# Patient Record
Sex: FEMALE | Race: ASIAN | NOT HISPANIC OR LATINO | Employment: FULL TIME | URBAN - METROPOLITAN AREA
[De-identification: names, ages, dates, MRNs, and addresses within clinical notes are randomized per-mention and may not be internally consistent; named-entity substitution may affect disease eponyms.]

---

## 2022-08-18 ENCOUNTER — OFFICE VISIT (OUTPATIENT)
Dept: URGENT CARE | Facility: PHYSICIAN GROUP | Age: 27
End: 2022-08-18
Payer: OTHER GOVERNMENT

## 2022-08-18 ENCOUNTER — HOSPITAL ENCOUNTER (OUTPATIENT)
Dept: RADIOLOGY | Facility: MEDICAL CENTER | Age: 27
End: 2022-08-18
Attending: PHYSICIAN ASSISTANT
Payer: OTHER GOVERNMENT

## 2022-08-18 VITALS
DIASTOLIC BLOOD PRESSURE: 70 MMHG | RESPIRATION RATE: 18 BRPM | OXYGEN SATURATION: 100 % | HEART RATE: 80 BPM | WEIGHT: 142 LBS | HEIGHT: 65 IN | BODY MASS INDEX: 23.66 KG/M2 | SYSTOLIC BLOOD PRESSURE: 120 MMHG | TEMPERATURE: 97.7 F

## 2022-08-18 DIAGNOSIS — S86.912A KNEE STRAIN, LEFT, INITIAL ENCOUNTER: ICD-10-CM

## 2022-08-18 DIAGNOSIS — M25.562 ACUTE PAIN OF LEFT KNEE: ICD-10-CM

## 2022-08-18 PROCEDURE — 99203 OFFICE O/P NEW LOW 30 MIN: CPT | Performed by: PHYSICIAN ASSISTANT

## 2022-08-18 PROCEDURE — 73564 X-RAY EXAM KNEE 4 OR MORE: CPT | Mod: LT

## 2022-08-19 NOTE — PROGRESS NOTES
"  Subjective:   Rikki Chappell is a 27 y.o. female who presents today with   Chief Complaint   Patient presents with    Knee Pain     Pt landed her Left knee wrong and twisted it during taekwodo . Back of knee is swollen with aching pain, x 4 days ago.      Knee Pain  This is a new problem. Episode onset: 4 days. The problem occurs constantly. Progression since onset: 4 days. Treatments tried: Knee brace. The treatment provided no relief.   Patient states she was performing taekwondo and did a leg kick and landed on the left leg and felt a pop in the left knee.  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  She has been having some swelling and pain in the back of the knee since that occurred.    PMH:  has no past medical history on file.  MEDS: No current outpatient medications on file.  ALLERGIES: Not on File  SURGHX: History reviewed. No pertinent surgical history.  SOCHX:  reports that she has never smoked. She has never used smokeless tobacco. She reports current alcohol use. She reports that she does not use drugs.  FH: Reviewed with patient, not pertinent to this visit.     Review of Systems   Musculoskeletal:         Left knee pain      Objective:   /70   Pulse 80   Temp 36.5 °C (97.7 °F) (Temporal)   Resp 18   Ht 1.651 m (5' 5\")   Wt 64.4 kg (142 lb)   SpO2 100%   Breastfeeding No   BMI 23.63 kg/m²   Physical Exam  Vitals and nursing note reviewed.   Constitutional:       General: She is not in acute distress.     Appearance: Normal appearance. She is well-developed. She is not ill-appearing or toxic-appearing.   HENT:      Head: Normocephalic and atraumatic.      Right Ear: Hearing normal.      Left Ear: Hearing normal.   Eyes:      Pupils: Pupils are equal, round, and reactive to light.   Cardiovascular:      Rate and Rhythm: Normal rate.   Pulmonary:      Effort: Pulmonary effort is normal.   Musculoskeletal:      " Comments: TTP to the left knee especially posteriorly. NVI distally.  No laxity with valgus or varus motion.  Patella is aligned.  Negative anterior drawer test. Slightly antalgic gait favoring the left side.   Skin:     General: Skin is warm and dry.   Neurological:      Mental Status: She is alert.      Coordination: Coordination normal.   Psychiatric:         Mood and Affect: Mood normal.       DX KNEE  FINDINGS:  There is no fracture.     Alignment is normal.     No degenerative changes are present.     IMPRESSION:     Negative left knee series    Assessment/Plan:   Assessment    1. Acute pain of left knee  - DX-KNEE COMPLETE 4+ LEFT; Future    2. Knee strain, left, initial encounter  - Referral to Sports Medicine  Symptoms and presentation at this time are consistent with left knee strain.  Recommend follow-up with sports medicine for further evaluation at that time if symptoms persist.  Recommend continue with rest, ice, compression and elevation.    Differential diagnosis, natural history, supportive care, and indications for immediate follow-up discussed.   Patient given instructions and understanding of medications and treatment.    If not improving in 3-5 days, F/U with PCP or return to  if symptoms worsen.    Patient agreeable to plan.    Please note that this dictation was created using voice recognition software. I have made every reasonable attempt to correct obvious errors, but I expect that there are errors of grammar and possibly content that I did not discover before finalizing the note.    Faustino Jones PA-C

## 2022-08-30 ENCOUNTER — OFFICE VISIT (OUTPATIENT)
Dept: SPORTS MEDICINE | Facility: CLINIC | Age: 27
End: 2022-08-30
Payer: OTHER GOVERNMENT

## 2022-08-30 VITALS
HEART RATE: 64 BPM | OXYGEN SATURATION: 98 % | DIASTOLIC BLOOD PRESSURE: 66 MMHG | BODY MASS INDEX: 23.66 KG/M2 | RESPIRATION RATE: 14 BRPM | TEMPERATURE: 98.2 F | SYSTOLIC BLOOD PRESSURE: 108 MMHG | HEIGHT: 65 IN | WEIGHT: 142 LBS

## 2022-08-30 DIAGNOSIS — M25.462 KNEE EFFUSION, LEFT: ICD-10-CM

## 2022-08-30 DIAGNOSIS — R29.898 POSITIVE LACHMAN TEST: ICD-10-CM

## 2022-08-30 DIAGNOSIS — M25.562 ACUTE PAIN OF LEFT KNEE: ICD-10-CM
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
## 2022-08-30 PROCEDURE — 99213 OFFICE O/P EST LOW 20 MIN: CPT | Performed by: FAMILY MEDICINE

## 2022-08-30 NOTE — PROGRESS NOTES
CHIEF COMPLAINT:  Rikki Chappell female presenting at the request of Faustino Jones P.A.-C. for evaluation of knee pain.     Rikki Chappell is complaining of left knee pain  Date of injury, Monday, August 15, 2022  Mechanism, doing a target kick in martial arts and landed while pivoting onto her LEFT knee  POSITIVE pop at the time of injury  Associated with sharp pain at the lateral and medial aspect of the LEFT knee  Pain is non-radiating   Improved with resting  Aggravated by movement, walking  no prior problems with this area in the past   Prior Treatments:  Seen at urgent care  Prior studies: X-Ray   Medications tried for pain include: ibuprofen (OTC), which does NOT help much  Mechanical Symptom history: No Locking and Stiffness and tight with difficulty fully flexing the knee    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 No pertinent past medical history.    PMH:  has no past medical history on file.  MEDS: No current outpatient medications on file.  ALLERGIES: Not on File  SURGHX: No past surgical history on file.  SOCHX:  reports that she has never smoked. She has never used smokeless tobacco. She reports current alcohol use. She reports that she does not use drugs.  FH: Family history was reviewed, no pertinent findings to report     PHYSICAL EXAM:  There were no vitals taken for this visit.     well-developed, well-nourished in no apparent distress, alert and oriented x 3.  Gait: antalgic     RIGHT Knee:  Slight Varus and No Swelling  Range of Motion Intact  Trace effusion  Patellar No tenderness and no apprehension  Medial Joint Line Non-tender and NEGATIVE Selina  Lateral Joint Line Non-tender and NEGATIVE Selina  Trace Laxity with Varus stress  Trace Laxity with Valgus stress  Lachman's testing is Trace  Posterior  Drawer Testing is Trace  The leg is otherwise neurovascularly intact    LEFT Knee:  Slight Varus and No Swelling   Range of Motion Markedly limited with Flexion and Pain at extremes of motion  2+ effusion  Patellar No tenderness and no apprehension  Medial Joint Line Tenderness and POSITIVE Selina  Lateral Joint Line Non-tender and NEGATIVE Selina  Trace Laxity with Varus stress  Trace Laxity with Valgus stress  Lachman's testing is 3+  Posterior Drawer Testing is Trace  The leg is otherwise neurovascularly intact    Additional Findings: None      1. Positive Lachman Test  MR-KNEE-W/O LEFT      2. Injury while engaged in martial arts  MR-KNEE-W/O LEFT      3. Knee effusion, left  MR-KNEE-W/O LEFT      4. Acute pain of left knee  MR-KNEE-W/O LEFT        Date of injury, Monday, August 15, 2022  Mechanism, doing a target kick in martial arts and landed while pivoting onto her LEFT knee  POSITIVE pop at the time of injury  Associated with sharp pain at the lateral and medial aspect of the LEFT knee    Obtained knee brace through urgent care  We did offer her a postop knee brace, but she politely declined at this time    Return in about 2 weeks (around 9/13/2022).  Follow-up after MRI to discuss results and further management options          8/18/2022 6:08 PM     HISTORY/REASON FOR EXAM:  Pain/Deformity Following Trauma.  Left knee pain, twisting injury     TECHNIQUE/EXAM DESCRIPTION AND NUMBER OF VIEWS:  4 views of the LEFT knee.     COMPARISON: None     FINDINGS:  There is no fracture.     Alignment is normal.     No degenerative changes are present.     IMPRESSION:     Negative left knee series           Exam Ended: 08/18/22  6:19 PM Last Resulted: 08/18/22  6:31 PM           done elsewhere and reviewed independently by me    Thank you Faustino Jones P.A.-C.  For allowing me to participate in caring for your patient.

## 2022-08-30 NOTE — Clinical Note
Maxi Harmon, Thank you for referring Rikki to our sports medicine clinic. Judging by her mechanism of injury and her exam findings, I think she tore her ACL and medial meniscus. We have ordered an MRI and we will see her back afterwards to discuss results and management plan. Hope you are well! L

## 2022-09-08 ENCOUNTER — HOSPITAL ENCOUNTER (OUTPATIENT)
Dept: RADIOLOGY | Facility: MEDICAL CENTER | Age: 27
End: 2022-09-08
Attending: FAMILY MEDICINE
Payer: OTHER GOVERNMENT

## 2022-09-08 DIAGNOSIS — M25.562 ACUTE PAIN OF LEFT KNEE: ICD-10-CM

## 2022-09-08 DIAGNOSIS — M25.462 KNEE EFFUSION, LEFT: ICD-10-CM

## 2022-09-08 DIAGNOSIS — R29.898 POSITIVE LACHMAN TEST: ICD-10-CM
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
## 2022-09-08 PROCEDURE — 73721 MRI JNT OF LWR EXTRE W/O DYE: CPT | Mod: LT

## 2022-09-13 ENCOUNTER — OFFICE VISIT (OUTPATIENT)
Dept: SPORTS MEDICINE | Facility: CLINIC | Age: 27
End: 2022-09-13
Payer: OTHER GOVERNMENT

## 2022-09-13 VITALS — WEIGHT: 142 LBS | BODY MASS INDEX: 23.66 KG/M2 | HEIGHT: 65 IN

## 2022-09-13 DIAGNOSIS — S83.512A NEW ACL TEAR, LEFT, INITIAL ENCOUNTER: ICD-10-CM

## 2022-09-13 PROCEDURE — 99213 OFFICE O/P EST LOW 20 MIN: CPT | Performed by: FAMILY MEDICINE

## 2022-09-13 NOTE — PROGRESS NOTES
Chief Complaint   Patient presents with    Knee Pain     F/V MRI results          1. New ACL tear, left, initial encounter  Referral to Orthopedics        POSITIVE ACL tear  Given her level of activity/martial arts fighting  Recommend ACL reconstruction    Referral placed for sports medicine specialist            9/8/2022 3:01 PM     HISTORY/REASON FOR EXAM:  Left knee pain.     TECHNIQUE/EXAM DESCRIPTION:  MRI of the LEFT knee without contrast.     Using a [employer] 1.5 Stephania MRI scanner, T1 coronal, proton density fat-suppressed coronal, fast spin-echo T2 fat-suppressed axial, intermediate fast spin-echo sagittal, and intermediate fast spin-echo fat-suppressed sagittal images were obtained.     COMPARISON:     FINDINGS:     Menisci: Intact.     Tendons and Ligaments: The anterior cruciate ligament is ill-defined and increased in signal with abnormal angulation consistent with full-thickness tear. There may be a few intact fibers present. The posterior cruciate ligament is intact. The collateral   ligaments are intact.     Extensor Mechanism/Patella: The extensor mechanism is intact.   The articular cartilage is intact.     Osseous Structures/Cartilaginous surfaces: There is mild marrow edema involving the lateral femoral condyle.     Miscellaneous: There is a small joint effusion. There is minimal popliteal cyst.     IMPRESSION:     1.  Ill-definition of the anterior cruciate ligament with abnormal course most consistent with full-thickness tear. There may be a few intact fibers remaining.     2.  Intact menisci.     3.  Mild marrow edema involving the lateral femoral condyle consistent with contusion.     4.  Small joint effusion.           Exam Ended: 09/08/22  3:01 PM Last Resulted: 09/08/22  3:26 PM              Interpreted in the office today with the patient

## 2022-09-22 PROBLEM — S83.512A SPRAIN OF ANTERIOR CRUCIATE LIGAMENT OF LEFT KNEE: Status: ACTIVE | Noted: 2022-09-22

## 2022-10-19 PROBLEM — S83.512A SPRAIN OF ANTERIOR CRUCIATE LIGAMENT OF LEFT KNEE, INITIAL ENCOUNTER: Status: ACTIVE | Noted: 2022-10-19

## 2023-09-06 ENCOUNTER — HOSPITAL ENCOUNTER (OUTPATIENT)
Dept: LAB | Facility: MEDICAL CENTER | Age: 28
End: 2023-09-06
Payer: OTHER GOVERNMENT

## 2023-09-06 ENCOUNTER — OFFICE VISIT (OUTPATIENT)
Dept: MEDICAL GROUP | Facility: PHYSICIAN GROUP | Age: 28
End: 2023-09-06
Payer: OTHER GOVERNMENT

## 2023-09-06 ENCOUNTER — APPOINTMENT (OUTPATIENT)
Dept: MEDICAL GROUP | Facility: PHYSICIAN GROUP | Age: 28
End: 2023-09-06
Payer: OTHER GOVERNMENT

## 2023-09-06 VITALS
DIASTOLIC BLOOD PRESSURE: 60 MMHG | HEART RATE: 64 BPM | BODY MASS INDEX: 24.49 KG/M2 | SYSTOLIC BLOOD PRESSURE: 90 MMHG | WEIGHT: 147 LBS | OXYGEN SATURATION: 99 % | HEIGHT: 65 IN | TEMPERATURE: 97.9 F

## 2023-09-06 DIAGNOSIS — R05.1 ACUTE COUGH: ICD-10-CM

## 2023-09-06 DIAGNOSIS — M25.512 ACUTE PAIN OF LEFT SHOULDER: ICD-10-CM

## 2023-09-06 DIAGNOSIS — Z11.3 ROUTINE SCREENING FOR STI (SEXUALLY TRANSMITTED INFECTION): ICD-10-CM

## 2023-09-06 PROCEDURE — 87389 HIV-1 AG W/HIV-1&-2 AB AG IA: CPT

## 2023-09-06 PROCEDURE — 3078F DIAST BP <80 MM HG: CPT

## 2023-09-06 PROCEDURE — 99213 OFFICE O/P EST LOW 20 MIN: CPT

## 2023-09-06 PROCEDURE — 36415 COLL VENOUS BLD VENIPUNCTURE: CPT

## 2023-09-06 PROCEDURE — 3074F SYST BP LT 130 MM HG: CPT

## 2023-09-06 SDOH — HEALTH STABILITY: MENTAL HEALTH
STRESS IS WHEN SOMEONE FEELS TENSE, NERVOUS, ANXIOUS, OR CAN'T SLEEP AT NIGHT BECAUSE THEIR MIND IS TROUBLED. HOW STRESSED ARE YOU?: TO SOME EXTENT

## 2023-09-06 SDOH — ECONOMIC STABILITY: TRANSPORTATION INSECURITY
IN THE PAST 12 MONTHS, HAS THE LACK OF TRANSPORTATION KEPT YOU FROM MEDICAL APPOINTMENTS OR FROM GETTING MEDICATIONS?: NO

## 2023-09-06 SDOH — ECONOMIC STABILITY: FOOD INSECURITY: WITHIN THE PAST 12 MONTHS, YOU WORRIED THAT YOUR FOOD WOULD RUN OUT BEFORE YOU GOT MONEY TO BUY MORE.: NEVER TRUE

## 2023-09-06 SDOH — ECONOMIC STABILITY: HOUSING INSECURITY
IN THE LAST 12 MONTHS, WAS THERE A TIME WHEN YOU DID NOT HAVE A STEADY PLACE TO SLEEP OR SLEPT IN A SHELTER (INCLUDING NOW)?: NO

## 2023-09-06 SDOH — ECONOMIC STABILITY: FOOD INSECURITY: WITHIN THE PAST 12 MONTHS, THE FOOD YOU BOUGHT JUST DIDN'T LAST AND YOU DIDN'T HAVE MONEY TO GET MORE.: NEVER TRUE

## 2023-09-06 SDOH — HEALTH STABILITY: PHYSICAL HEALTH: ON AVERAGE, HOW MANY MINUTES DO YOU ENGAGE IN EXERCISE AT THIS LEVEL?: 40 MIN

## 2023-09-06 SDOH — ECONOMIC STABILITY: INCOME INSECURITY: HOW HARD IS IT FOR YOU TO PAY FOR THE VERY BASICS LIKE FOOD, HOUSING, MEDICAL CARE, AND HEATING?: NOT VERY HARD

## 2023-09-06 SDOH — ECONOMIC STABILITY: INCOME INSECURITY: IN THE LAST 12 MONTHS, WAS THERE A TIME WHEN YOU WERE NOT ABLE TO PAY THE MORTGAGE OR RENT ON TIME?: NO

## 2023-09-06 SDOH — ECONOMIC STABILITY: HOUSING INSECURITY: IN THE LAST 12 MONTHS, HOW MANY PLACES HAVE YOU LIVED?: 1

## 2023-09-06 SDOH — HEALTH STABILITY: PHYSICAL HEALTH: ON AVERAGE, HOW MANY DAYS PER WEEK DO YOU ENGAGE IN MODERATE TO STRENUOUS EXERCISE (LIKE A BRISK WALK)?: 3 DAYS

## 2023-09-06 SDOH — ECONOMIC STABILITY: TRANSPORTATION INSECURITY
IN THE PAST 12 MONTHS, HAS LACK OF TRANSPORTATION KEPT YOU FROM MEETINGS, WORK, OR FROM GETTING THINGS NEEDED FOR DAILY LIVING?: NO

## 2023-09-06 SDOH — ECONOMIC STABILITY: TRANSPORTATION INSECURITY
IN THE PAST 12 MONTHS, HAS LACK OF RELIABLE TRANSPORTATION KEPT YOU FROM MEDICAL APPOINTMENTS, MEETINGS, WORK OR FROM GETTING THINGS NEEDED FOR DAILY LIVING?: NO

## 2023-09-06 ASSESSMENT — LIFESTYLE VARIABLES
HOW OFTEN DO YOU HAVE SIX OR MORE DRINKS ON ONE OCCASION: NEVER
SKIP TO QUESTIONS 9-10: 1
HOW OFTEN DO YOU HAVE A DRINK CONTAINING ALCOHOL: MONTHLY OR LESS
AUDIT-C TOTAL SCORE: 1
HOW MANY STANDARD DRINKS CONTAINING ALCOHOL DO YOU HAVE ON A TYPICAL DAY: 1 OR 2

## 2023-09-06 ASSESSMENT — SOCIAL DETERMINANTS OF HEALTH (SDOH)
IN A TYPICAL WEEK, HOW MANY TIMES DO YOU TALK ON THE PHONE WITH FAMILY, FRIENDS, OR NEIGHBORS?: MORE THAN THREE TIMES A WEEK
HOW OFTEN DO YOU ATTENT MEETINGS OF THE CLUB OR ORGANIZATION YOU BELONG TO?: MORE THAN 4 TIMES PER YEAR
HOW OFTEN DO YOU HAVE SIX OR MORE DRINKS ON ONE OCCASION: NEVER
HOW OFTEN DO YOU ATTEND CHURCH OR RELIGIOUS SERVICES?: MORE THAN 4 TIMES PER YEAR
HOW OFTEN DO YOU GET TOGETHER WITH FRIENDS OR RELATIVES?: MORE THAN THREE TIMES A WEEK
DO YOU BELONG TO ANY CLUBS OR ORGANIZATIONS SUCH AS CHURCH GROUPS UNIONS, FRATERNAL OR ATHLETIC GROUPS, OR SCHOOL GROUPS?: YES
ARE YOU MARRIED, WIDOWED, DIVORCED, SEPARATED, NEVER MARRIED, OR LIVING WITH A PARTNER?: NEVER MARRIED
DO YOU BELONG TO ANY CLUBS OR ORGANIZATIONS SUCH AS CHURCH GROUPS UNIONS, FRATERNAL OR ATHLETIC GROUPS, OR SCHOOL GROUPS?: YES
WITHIN THE PAST 12 MONTHS, YOU WORRIED THAT YOUR FOOD WOULD RUN OUT BEFORE YOU GOT THE MONEY TO BUY MORE: NEVER TRUE
HOW OFTEN DO YOU ATTENT MEETINGS OF THE CLUB OR ORGANIZATION YOU BELONG TO?: MORE THAN 4 TIMES PER YEAR
IN A TYPICAL WEEK, HOW MANY TIMES DO YOU TALK ON THE PHONE WITH FAMILY, FRIENDS, OR NEIGHBORS?: MORE THAN THREE TIMES A WEEK
HOW MANY DRINKS CONTAINING ALCOHOL DO YOU HAVE ON A TYPICAL DAY WHEN YOU ARE DRINKING: 1 OR 2
HOW OFTEN DO YOU GET TOGETHER WITH FRIENDS OR RELATIVES?: MORE THAN THREE TIMES A WEEK
HOW HARD IS IT FOR YOU TO PAY FOR THE VERY BASICS LIKE FOOD, HOUSING, MEDICAL CARE, AND HEATING?: NOT VERY HARD
HOW OFTEN DO YOU HAVE A DRINK CONTAINING ALCOHOL: MONTHLY OR LESS
HOW OFTEN DO YOU ATTEND CHURCH OR RELIGIOUS SERVICES?: MORE THAN 4 TIMES PER YEAR
ARE YOU MARRIED, WIDOWED, DIVORCED, SEPARATED, NEVER MARRIED, OR LIVING WITH A PARTNER?: NEVER MARRIED

## 2023-09-06 ASSESSMENT — ENCOUNTER SYMPTOMS
SHORTNESS OF BREATH: 0
FEVER: 0
CHILLS: 1
COUGH: 1
SPUTUM PRODUCTION: 0

## 2023-09-06 ASSESSMENT — PATIENT HEALTH QUESTIONNAIRE - PHQ9: CLINICAL INTERPRETATION OF PHQ2 SCORE: 0

## 2023-09-06 NOTE — ASSESSMENT & PLAN NOTE
Acute, uncomplicated   -Maintain adequate hydration of at least 64oz of water per day, humidifier at bedside, rest   -Okay to take OTC Mucinex 1-2 tablets every 12 hours as needed for cough, Ibuprofen 400-600mg every 8 hours if needed for discomfort  -Follow up in office in 48 hours if not improving or go to ED for chest pain or shortness of breath

## 2023-09-06 NOTE — PROGRESS NOTES
"Subjective:     CC:  Diagnoses of Routine screening for STI (sexually transmitted infection), Acute cough, and Acute pain of left shoulder were pertinent to this visit.    HISTORY OF THE PRESENT ILLNESS: Patient is a 28 y.o. female. This pleasant patient is here today to establish care and discuss the following problems:    Problem   Acute Cough    4-5 day history of cough non productive (when cough started secretions were yellow), feels chills upon waking, reports to have had mild SOB yesterday. Denies fever/myalgias. She has a close coworker with similar symptoms. Home Covid test is negative.      Acute Pain of Left Shoulder    Onset: 2-3 weeks, injury to left shoulder during obstacle course, no direct impact  Location AC joint  Radiation none  Duration constant  Character ache  Alleviated by nothing  Exacerbated by stretching  Associated symptoms none  TX: Advil not helping.            Health Maintenance: Completed - through     ROS:   Review of Systems   Constitutional:  Positive for chills. Negative for fever.   Respiratory:  Positive for cough. Negative for sputum production and shortness of breath.    Musculoskeletal:  Positive for joint pain.   All other systems reviewed and are negative.        Objective:     Exam: BP 90/60 (BP Location: Left arm, Patient Position: Sitting, BP Cuff Size: Adult)   Pulse 64   Temp 36.6 °C (97.9 °F) (Temporal)   Ht 1.651 m (5' 5\")   Wt 66.7 kg (147 lb)   SpO2 99%  Body mass index is 24.46 kg/m².    Physical Exam  Constitutional:       General: She is not in acute distress.     Appearance: Normal appearance. She is not ill-appearing or toxic-appearing.   HENT:      Head: Normocephalic.   Eyes:      Conjunctiva/sclera: Conjunctivae normal.   Cardiovascular:      Rate and Rhythm: Normal rate and regular rhythm.      Pulses: Normal pulses.      Heart sounds: Normal heart sounds. No murmur heard.  Pulmonary:      Effort: Pulmonary effort is normal. No respiratory " distress.      Breath sounds: Normal breath sounds.   Skin:     General: Skin is warm and dry.   Neurological:      General: No focal deficit present.      Mental Status: She is alert and oriented to person, place, and time.   Psychiatric:         Mood and Affect: Mood normal.         Behavior: Behavior normal.         Inspection: no atrophy, deformity, skin lesions, surgical scars.   Passive ROM: forward flexion (160`), external rotation (60`) internal rotation (T12)  Rotator Cuff strength: Supraspinatus 5/5, Drop Arm Sign absent, External rotation 5/5, Lift off able.  AC Joint/Impingement: Positive tenderness AC, Coracord. Cross arm test with  pain at AC joint   Biceps: no tenderness at bicipital groove. Sensation is grossly intact.  2+ radial pulse.   Elbow: No gross tenderness to the medial or lateral epicondyl or radial head. No obvious deformity of the elbow.           Labs:     Assessment & Plan: Medical Decision Making   28 y.o. female with the following -    Problem List Items Addressed This Visit       Acute cough     Acute, uncomplicated   -Maintain adequate hydration of at least 64oz of water per day, humidifier at bedside, rest   -Okay to take OTC Mucinex 1-2 tablets every 12 hours as needed for cough, Ibuprofen 400-600mg every 8 hours if needed for discomfort  -Follow up in office in 48 hours if not improving or go to ED for chest pain or shortness of breath         Acute pain of left shoulder     Acute condition, uncomplicated  - RICE along with alternating heat/ice compression  - OTC tylenol 500-1000 mg every 8 hours and/or ibuprofen 400-600 mg every 4-6 hours as needed for pain  -  Referral to chiropractor as requested           Relevant Orders    Referral to Chiropractic     Other Visit Diagnoses       Routine screening for STI (sexually transmitted infection)        Relevant Orders    HIV AG/AB COMBO ASSAY SCREENING            Differential diagnosis, natural history, supportive care, and  indications for immediate follow-up discussed.  Shared decision making approach was utilized, and patient is amendable with plan of care.  Patient understands to return to clinic or go to the emergency department if symptoms worsen. All questions and concerns addressed to the best of my knowledge.      Return in about 1 year (around 9/6/2024), or if symptoms worsen or fail to improve.    Please note that this dictation was created using voice recognition software. I have made every reasonable attempt to correct obvious errors, but I expect that there are errors of grammar and possibly content that I did not discover before finalizing the note.

## 2023-09-06 NOTE — ASSESSMENT & PLAN NOTE
Acute condition, uncomplicated  - RICE along with alternating heat/ice compression  - OTC tylenol 500-1000 mg every 8 hours and/or ibuprofen 400-600 mg every 4-6 hours as needed for pain  -  Referral to chiropractor as requested

## 2023-09-07 LAB — HIV 1+2 AB+HIV1 P24 AG SERPL QL IA: NORMAL

## 2023-09-13 ENCOUNTER — APPOINTMENT (OUTPATIENT)
Dept: MEDICAL GROUP | Facility: PHYSICIAN GROUP | Age: 28
End: 2023-09-13
Payer: OTHER GOVERNMENT

## 2023-10-25 ENCOUNTER — APPOINTMENT (OUTPATIENT)
Dept: RADIOLOGY | Facility: IMAGING CENTER | Age: 28
End: 2023-10-25
Attending: STUDENT IN AN ORGANIZED HEALTH CARE EDUCATION/TRAINING PROGRAM
Payer: OTHER GOVERNMENT

## 2023-10-25 ENCOUNTER — OCCUPATIONAL MEDICINE (OUTPATIENT)
Dept: URGENT CARE | Facility: CLINIC | Age: 28
End: 2023-10-25
Payer: OTHER MISCELLANEOUS

## 2023-10-25 VITALS
TEMPERATURE: 97.5 F | OXYGEN SATURATION: 96 % | WEIGHT: 141 LBS | BODY MASS INDEX: 22.66 KG/M2 | HEART RATE: 60 BPM | RESPIRATION RATE: 16 BRPM | HEIGHT: 66 IN | SYSTOLIC BLOOD PRESSURE: 108 MMHG | DIASTOLIC BLOOD PRESSURE: 64 MMHG

## 2023-10-25 DIAGNOSIS — M79.672 LEFT FOOT PAIN: ICD-10-CM

## 2023-10-25 PROCEDURE — 73630 X-RAY EXAM OF FOOT: CPT | Mod: TC,LT | Performed by: STUDENT IN AN ORGANIZED HEALTH CARE EDUCATION/TRAINING PROGRAM

## 2023-10-25 PROCEDURE — 3078F DIAST BP <80 MM HG: CPT | Performed by: STUDENT IN AN ORGANIZED HEALTH CARE EDUCATION/TRAINING PROGRAM

## 2023-10-25 PROCEDURE — 99213 OFFICE O/P EST LOW 20 MIN: CPT | Performed by: STUDENT IN AN ORGANIZED HEALTH CARE EDUCATION/TRAINING PROGRAM

## 2023-10-25 PROCEDURE — 3074F SYST BP LT 130 MM HG: CPT | Performed by: STUDENT IN AN ORGANIZED HEALTH CARE EDUCATION/TRAINING PROGRAM

## 2023-10-25 NOTE — LETTER
"    EMPLOYEE’S CLAIM FOR COMPENSATION/ REPORT OF INITIAL TREATMENT  FORM C-4  PLEASE TYPE OR PRINT    EMPLOYEE’S CLAIM - PROVIDE ALL INFORMATION REQUESTED   First Name                    VERNELL Brandt Last Name  Ta Birthdate                    1995                Sex  []M  []F Claim Number (Insurer’s Use Only)     Home Address  [address] Age  28 y.o. 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 (if applicable)  WALKING    How did this injury or occupational disease occur? (Be specific and answer in detail. Use additional sheet if necessary)  I WAS JUST WALKING AT WORK AND ACCIDENTLY TWISTED MY ANKLE   If you believe that you have an occupational disease, when did you first have knowledge of the disability and its relationship to your employment?   Witnesses to the Accident (if applicable)  SHBAANA JIMENEZ      Nature of Injury or Occupational Disease  Strain  Part(s) of Body Injured or Affected  Foot (L) N/A N/A    I CERTIFY THAT THE ABOVE IS TRUE AND CORRECT " TO T HE BEST OF MY KNOWLEDGE AND THAT I HAVE PROVIDED THIS INFORMATION IN ORDER TO OBTAIN THE BENEFITS OF [place], OR [place]).  I HEREBY AUTHORIZE ANY PHYSICIAN, CHIROPRACTOR, SURGEON, PRACTITIONER OR ANY OTHER PERSON, ANY HOSPITAL, INCLUDING [facility] OR [facility], ANY  MEDICAL SERVICE ORGANIZATION, ANY INSURANCE COMPANY, OR OTHER INSTITUTION OR ORGANIZATION TO RELEASE TO EACH OTHER, ANY MEDICAL OR OTHER INFORMATION, INCLUDING BENEFITS PAID OR PAYABLE, PERTINENT TO THIS INJURY OR DISEASE, EXCEPT INFORMATION RELATIVE TO DIAGNOSIS, TREATMENT AND/OR COUNSELING FOR AIDS, PSYCHOLOGICAL CONDITIONS, ALCOHOL OR CONTROLLED SUBSTANCES, FOR WHICH I MUST GIVE SPECIFIC AUTHORIZATION.  A PHOTOSTAT OF THIS AUTHORIZATION SHALL BE VALID AS THE ORIGINAL.     Date: 10/25/2023   Place: [facility] urgent care  Employee’s Original or  *Electronic Signature   THIS REPORT MUST BE COMPLETED AND MAILED WITHIN 3 WORKING DAYS OF TREATMENT   Place  [facility]    Name of Facility  [facility]   Date 10/25/2023 Diagnosis and Description of Injury or Occupational Disease  (M79.672) Left foot pain  The encounter diagnosis was Left foot pain. Is there evidence that the injured employee was under the influence of alcohol and/or another controlled substance at the time of accident?  []No  [] Yes (if yes, please explain)   Hour 6:09 PM  No   Treatment: X-rays negative for any acute osseous abnormalities.  Likely tendon/insertional injury; no instability on examination.  No focal bony TTP.  -No work restrictions needed  -Ordered referral to physical therapy  -Recommended ibuprofen 800 mg every 8 hours as needed  -Follow-up with occupational medicine at [address] [place] [address] in 2 weeks      Have you advised the patient to remain off work five days or more?   [] Yes Indicate dates: From   To    []No If no, is the injured  employee capable of: [] full duty [] modified duty                                                             Yes     If modified duty, specify any limitations / restrictions:                                                                                                                                                                                                                                                                                                                                                                                                                  X-Ray Findings:      From information given by the employee, together with medical evidence, can you directly connect this injury or occupational disease as job incurred?  []Yes   [] No Yes    Is additional medical care by a physician indicated? []Yes [] No  Yes    Do you know of any previous injury or disease contributing to this condition or occupational disease? []Yes [] No (Explain if yes)                          No   Date  10/25/2023 Print Health Care Provider’s Name  Carlos Iqbal D.O. I certify that the employer’s copy of  this form was delivered to the employer on:   Address  [address] INSURER'S USE ONLY                       [place] Zip  [address] Provider’s Tax ID Number  [id number]   Telephone  Dept: [phone]    Health Care Provider’s Original or Electronic Signature  e-CARLOS Manuel D.O. Degree (MD,DO, DC,PA-C,APRN)  DO  Choose (if applicable)      ORIGINAL - TREATING HEALTHCARE PROVIDER PAGE 2 - INSURER/TPA PAGE 3 - EMPLOYER PAGE 4 - EMPLOYEE             Form C-4 (rev.08/23)        BRIEF DESCRIPTION OF RIGHTS AND BENEFITS  (Pursuant to [place] 616C.050)    Notice of Injury or Occupational Disease (Incident Report Form C-1): If an injury or occupational disease (OD) arises out of and in the course of employment, you must provide written notice to your employer as soon as practicable, but no later  "than 7 days after the accident or OD. Your employer shall maintain a sufficient supply of the required forms.    Claim for Compensation (Form C-4): If medical treatment is sought, the form C-4 is available at the place of initial treatment. A completed \"Claim for Compensation\" (Form C-4) must be filed within 90 days after an accident or OD. The treating physician or chiropractor must, within 3 working days after treatment, complete and mail to the employer, the employer's insurer and third-party , the Claim for Compensation.    Medical Treatment: If you require medical treatment for your on-the-job injury or OD, you may be required to select a physician or chiropractor from a list provided by your workers’ compensation insurer, if it has contracted with an Organization for Managed Care (MCO) or Preferred Provider Organization (PPO) or providers of health care. If your employer has not entered into a contract with an MCO or PPO, you may select a physician or chiropractor from the Panel of Physicians and Chiropractors. Any medical costs related to your industrial injury or OD will be paid by your insurer.    Temporary Total Disability (TTD): If your doctor has certified that you are unable to work for a period of at least 5 consecutive days, or 5 cumulative days in a 20-day period, or places restrictions on you that your employer does not accommodate, you may be entitled to TTD compensation.    Temporary Partial Disability (TPD): If the wage you receive upon reemployment is less than the compensation for TTD to which you are entitled, the insurer may be required to pay you TPD compensation to make up the difference. TPD can only be paid for a maximum of 24 months.    Permanent Partial Disability (PPD): When your medical condition is stable and there is an indication of a PPD as a result of your injury or OD, within 30 days, your insurer must arrange for an evaluation by a rating physician or chiropractor " to determine the degree of your PPD. The amount of your PPD award depends on the date of injury, the results of the PPD evaluation, your age and wage.    Permanent Total Disability (PTD): If you are medically certified by a treating physician or chiropractor as permanently and totally disabled and have been granted a PTD status by your insurer, you are entitled to receive monthly benefits not to exceed 66 2/3% of your average monthly wage. The amount of your PTD payments is subject to reduction if you previously received a lump-sum PPD award.    Vocational Rehabilitation Services: You may be eligible for vocational rehabilitation services if you are unable to return to the job due to a permanent physical impairment or permanent restrictions as a result of your injury or occupational disease.    Transportation and Per Lorenza Reimbursement: You may be eligible for travel expenses and per lorenza associated with medical treatment.    Reopening: You may be able to reopen your claim if your condition worsens after claim closure.     Appeal Process: If you disagree with a written determination issued by the insurer or the insurer does not respond to your request, you may appeal to the Department of Administration, , by following the instructions contained in your determination letter. You must appeal the determination within 70 days from the date of the determination letter at [address], [address], [place], [place] [address], or [address] [place], [place] [address]. If you disagree with the  decision, you may appeal to the Department of Administration, . You must file your appeal within 30 days from the date of the  decision letter at [address], [address], [place], [place] [address], or [address] [place], [place] [address]. If you disagree with a decision of an , you may file a petition for  judicial review with the District Court. You must do so within 30 days of the Appeal Officer’s decision. You may be represented by an  at your own expense or you may contact the [facility] for possible representation.    [place]  for Injured Workers (NAIW): If you disagree with a  decision, you may request that NAIW represent you without charge at an  Hearing. For information regarding denial of benefits, you may contact the [facility] at: [address], [place] [address], [phone]) [phone], or [address], [address], [place] [address], [phone] [phone]    To File a Complaint with the Division: If you wish to file a complaint with the  of the Division of Industrial Relations (DIR),  please contact the Workers’ Compensation Section, [address], [address], [place], [place] [address], telephone (732) [phone], or [address], [place], [place] [address], telephone [phone]) [phone].    For assistance with Workers’ Compensation Issues: You may contact the [facility] Office for Consumer Health Assistance, [address], [address], [place], [place] [address], Toll Free 1-883.421.6800, Web site: http://[facility].[place].gov/Programs/ALFRED E-mail: [email or web address].[email or web address].[facility]              __________________________________________________________________                                    _________________            Employee Name / Signature                                                                                                                            Date                                                                                                                                                                                                                              D-2 (rev. 10/20)

## 2023-10-25 NOTE — LETTER
[facility]  [address] [place] [address]  Phone:  [phone] - Fax:  [phone]   [facility] Progress Report and Disability Certification  Date of Service: 10/25/2023   No Show:  No  Date / Time of Next Visit: 11/8/2023 w/ occ med   Claim Information   Patient Name: Rikki Chappell  Claim Number:     Employer: [employer]  Date of Injury: 10/25/2023     Insurer / TPA: Federal Workcomp  ID / SSN:     Occupation: GI-EPM  Diagnosis: The encounter diagnosis was Left foot pain.    Medical Information   Related to Industrial Injury? Yes    Subjective Complaints:  Today's date: 10/25/2023.  Visit #1  Date of injury: 10/11/2023  CC: Left foot pain  Patient presents with a chief complaint of pain along the left lateral foot.  Says she was walking while at work twisted her ankle which were described as an inversion injury subsequently developed pain along the lateral margins of the foot.  She points to the base of the fifth metatarsal as source of discomfort.  There is no bruising or swelling.  She did not hear or feel a pop.  Says symptoms are aggravated when wearing tight shoes.  She is not having any pain with ambulation.  Says she has tried using a topical spray which has provided him relief of symptoms.  She has not tried any anti-inflammatories.  Reports history of recurrent ankle sprains/instability.  No history of ankle surgery.     Objective Findings: Gen: no acute distress, normal voice  Skin: dry, intact, moist mucosal membranes  Head: Atraumatic, normocephalic  Psych: normal affect, normal judgement, alert, awake  Musculoskeletal: No erythema, ecchymosis or edema.  Full range of motion in all planes without any discernible discomfort.  No tenderness with resisted eversion/inversion.  No focal bony or soft tissue tenderness to palpation.  Negative anterior drawer test.  Achilles mechanism fully intact.      RADIOLOGY RESULTS  DX-FOOT-COMPLETE 3+  LEFT    Result Date: 10/25/2023  10/25/2023 6:25 PM HISTORY/REASON FOR EXAM:  Pain/Deformity Following Trauma Foot pain TECHNIQUE/EXAM DESCRIPTION AND NUMBER OF VIEWS: 3 views of the LEFT foot. COMPARISON:  None. FINDINGS: There is no fracture or dislocation. The visualized osseous structures are in anatomic alignment. Mild hallux valgus deformity. The joint spaces are preserved. Bone mineralization is age-appropriate..     No acute osseous abnormality. Mild hallux valgus deformity.          '     Pre-Existing Condition(s):     Assessment:   Initial Visit    Status: Additional Care Required  Permanent Disability:No    Plan:      Diagnostics:      Comments:       Disability Information   Status: Released to Full Duty    From:  10/25/2023  Through: 11/8/2023 Restrictions are:     Physical Restrictions   Sitting:    Standing:    Stooping:    Bending:      Squatting:    Walking:    Climbing:    Pushing:      Pulling:    Other:    Reaching Above Shoulder (L):   Reaching Above Shoulder (R):       Reaching Below Shoulder (L):    Reaching Below Shoulder (R):      Not to exceed Weight Limits   Carrying(hrs):   Weight Limit(lb):   Lifting(hrs):   Weight  Limit(lb):     Comments: X-rays negative for any acute osseous abnormalities.  Likely tendon/insertional injury; no instability on examination.  No focal bony TTP.  -No work restrictions needed  -Ordered referral to physical therapy  -Recommended ibuprofen 800 mg every 8 hours as needed  -Follow-up with occupational medicine at [address] [place] [address] in 2 weeks    Repetitive Actions   Hands: i.e. Fine Manipulations from Grasping:     Feet: i.e. Operating Foot Controls:     Driving / Operate Machinery:     Health Care Provider’s Original or Electronic Signature  Carlos Iqbal D.O. Health Care Provider’s Original or Electronic Signature    Shahab Schmitt DO MPH     Clinic Name / Location: [facility]  [address] [place] [address] Clinic Phone Number:  Dept: [phone]   Appointment Time: 5:30 Pm Visit Start Time: 6:09 PM   Check-In Time:  5:34 Pm Visit Discharge Time:     Original-Treating Physician or Chiropractor    Page 2-Insurer/TPA    Page 3-Employer    Page 4-Employee

## 2023-10-26 NOTE — PROGRESS NOTES
"Subjective:     Rikki Chappell is a 28 y.o. female who presents for Foot Injury (Twisted foot ,left ,two weeks ago , still hurting .)      Today's date: 10/25/2023.  Visit #1  Date of injury: 10/11/2023  CC: Left foot pain  Patient presents with a chief complaint of pain along the left lateral foot.  Says she was walking while at work twisted her ankle which were described as an inversion injury subsequently developed pain along the lateral margins of the foot.  She points to the base of the fifth metatarsal as source of discomfort.  There is no bruising or swelling.  She did not hear or feel a pop.  Says symptoms are aggravated when wearing tight shoes.  She is not having any pain with ambulation.  Says she has tried using a topical spray which has provided him relief of symptoms.  She has not tried any anti-inflammatories.  Reports history of recurrent ankle sprains/instability.  No history of ankle surgery.      PMH:   No pertinent past medical history to this problem  MEDS:  Medications were reviewed in EMR  ALLERGIES:  Allergies were reviewed in EMR  FH:   No pertinent family history to this problem       Objective:     /64   Pulse 60   Temp 36.4 °C (97.5 °F) (Temporal)   Resp 16   Ht 1.676 m (5' 6\")   Wt 64 kg (141 lb)   SpO2 96%   BMI 22.76 kg/m²     Gen: no acute distress, normal voice  Skin: dry, intact, moist mucosal membranes  Head: Atraumatic, normocephalic  Psych: normal affect, normal judgement, alert, awake  Musculoskeletal: No erythema, ecchymosis or edema.  Full range of motion in all planes without any discernible discomfort.  No tenderness with resisted eversion/inversion.  No focal bony or soft tissue tenderness to palpation.  Negative anterior drawer test.  Achilles mechanism fully intact.      RADIOLOGY RESULTS  DX-FOOT-COMPLETE 3+ LEFT    Result Date: 10/25/2023  10/25/2023 6:25 PM HISTORY/REASON FOR EXAM:  Pain/Deformity Following Trauma Foot pain TECHNIQUE/EXAM DESCRIPTION " AND NUMBER OF VIEWS: 3 views of the LEFT foot. COMPARISON:  None. FINDINGS: There is no fracture or dislocation. The visualized osseous structures are in anatomic alignment. Mild hallux valgus deformity. The joint spaces are preserved. Bone mineralization is age-appropriate..     No acute osseous abnormality. Mild hallux valgus deformity.          '      Assessment/Plan:       1. Left foot pain  - DX-FOOT-COMPLETE 3+ LEFT; Future  - Referral to Physical Therapy    Released to Full Duty FROM 10/25/2023 TO 11/8/2023  X-rays negative for any acute osseous abnormalities.  Likely tendon/insertional injury; no instability on examination.  No focal bony TTP.  -No work restrictions needed  -Ordered referral to physical therapy  -Recommended ibuprofen 800 mg every 8 hours as needed  -Follow-up with occupational medicine at [address] [place] [address] in 2 weeks       Differential diagnosis, natural history, supportive care, and indications for immediate follow-up discussed.

## 2025-06-02 ENCOUNTER — APPOINTMENT (OUTPATIENT)
Dept: MEDICAL GROUP | Facility: PHYSICIAN GROUP | Age: 30
End: 2025-06-02
Payer: OTHER GOVERNMENT

## 2025-06-03 ENCOUNTER — OFFICE VISIT (OUTPATIENT)
Dept: MEDICAL GROUP | Facility: PHYSICIAN GROUP | Age: 30
End: 2025-06-03
Payer: OTHER GOVERNMENT

## 2025-06-03 VITALS
SYSTOLIC BLOOD PRESSURE: 118 MMHG | OXYGEN SATURATION: 100 % | HEIGHT: 66 IN | DIASTOLIC BLOOD PRESSURE: 60 MMHG | BODY MASS INDEX: 23.18 KG/M2 | WEIGHT: 144.2 LBS | TEMPERATURE: 97.3 F | HEART RATE: 68 BPM

## 2025-06-03 DIAGNOSIS — S49.92XA INJURY OF LEFT SHOULDER, INITIAL ENCOUNTER: Primary | ICD-10-CM

## 2025-06-03 DIAGNOSIS — H57.89 EYE REDNESS: ICD-10-CM

## 2025-06-03 PROCEDURE — 3074F SYST BP LT 130 MM HG: CPT

## 2025-06-03 PROCEDURE — 3078F DIAST BP <80 MM HG: CPT

## 2025-06-03 PROCEDURE — 99213 OFFICE O/P EST LOW 20 MIN: CPT

## 2025-06-03 ASSESSMENT — ENCOUNTER SYMPTOMS
WEAKNESS: 0
HEADACHES: 0
SHORTNESS OF BREATH: 0
ABDOMINAL PAIN: 0
MYALGIAS: 0
NAUSEA: 0
COUGH: 0
VOMITING: 0
CONSTIPATION: 0
BLURRED VISION: 0
DIZZINESS: 0
CHILLS: 0
WEIGHT LOSS: 0
FEVER: 0
DIARRHEA: 0

## 2025-06-03 ASSESSMENT — PATIENT HEALTH QUESTIONNAIRE - PHQ9: CLINICAL INTERPRETATION OF PHQ2 SCORE: 0

## 2025-06-03 NOTE — PROGRESS NOTES
Verbal consent was acquired by the patient to use [email or web address] ambient listening note generation during this visit  Subjective:     CC:  The primary encounter diagnosis was Injury of left shoulder, initial encounter. A diagnosis of Eye redness was also pertinent to this visit.    HISTORY OF THE PRESENT ILLNESS: Patient is a 29 y.o. female.   No problems updated.    History of Present Illness  The patient presents for evaluation of shoulder pain and bilateral ocular erythema.    Shoulder Pain  - Reports experiencing a distinct shoulder pain, differing from the pain encountered in 2023  - Current pain commenced on 05/05/2025, subsequent to an incident on an inflatable obstacle course where she sustained a head injury  - Resulted in numbness and an inability to elevate her arm  - Notes the presence of a palpable mass that becomes evident upon lateral arm elevation  - Has a prior diagnosis of cervical sprain  - Radiographic imaging performed at [place], [place], indicated no dislocation  - Denies any shoulder instability or biceps brachii pain  - No history of claustrophobia and has previously undergone MRI without issue  - Prescribed diazepam and a 5-day course of corticosteroids for muscle atrophy, but these medications did not provide symptom relief    Bilateral Ocular Erythema  - Experienced bilateral ocular erythema for two days upon returning from [place] on 05/31/2025  - Undetermined etiology  - Reports no allergies to cottonwood or [place]brush in [place]  - Ocular condition has improved, with no signs of discharge or irritation    Supplemental information: She has undergone hepatitis C screening through the . She has never had a Papanicolaou (Pap) smear.    PAST SURGICAL HISTORY:  Anterior cruciate ligament (ACL) repair.    ROS:   Review of Systems   Constitutional:  Negative for chills, fever, malaise/fatigue and weight loss.   Eyes:  Negative for blurred vision.   Respiratory:  Negative for cough and  "shortness of breath.    Cardiovascular:  Negative for chest pain.   Gastrointestinal:  Negative for abdominal pain, constipation, diarrhea, nausea and vomiting.   Musculoskeletal:  Positive for joint pain. Negative for myalgias.   Neurological:  Negative for dizziness, weakness and headaches.         Objective:     Exam: /60 (BP Location: Left arm, Patient Position: Sitting, BP Cuff Size: Adult)   Pulse 68   Temp 36.3 °C (97.3 °F) (Temporal)   Ht 1.676 m (5' 6\")   Wt 65.4 kg (144 lb 3.2 oz)   SpO2 100%  Body mass index is 23.27 kg/m².    Physical Exam  Constitutional:       General: She is not in acute distress.     Appearance: Normal appearance. She is not ill-appearing or toxic-appearing.   HENT:      Head: Normocephalic.   Eyes:      Conjunctiva/sclera: Conjunctivae normal.   Pulmonary:      Effort: Pulmonary effort is normal.   Skin:     General: Skin is warm and dry.   Neurological:      General: No focal deficit present.      Mental Status: She is alert and oriented to person, place, and time.   Psychiatric:         Mood and Affect: Mood normal.         Behavior: Behavior normal.         Inspection: no atrophy, + swelling and deformity of trapezius muscle, no skin lesions, no surgical scars.   Passive ROM: forward flexion (160`), external rotation (60`) internal rotation (T12)  Rotator Cuff strength: Supraspinatus 4/5, Drop Arm Sign positive, External rotation 5/5, Lift off able.  AC Joint/Impingement: Positive tenderness AC, Coracord. Cross arm test with pain at AC joint- Highly suspicious for RTC tear  Biceps: no tenderness at bicipital groove. Sensation is grossly intact.  2+ radial pulse.   Elbow: No gross tenderness to the medial or lateral epicondyl or radial head. No obvious deformity of the elbow.         Labs:     Assessment & Plan: Medical Decision Making   29 y.o. female with the following -    Assessment & Plan  1. Shoulder pain.  Undiagnosed condition of uncertain prognosis  - The " patient presents with a positive drop arm sign and weakness with empty can test, indicative of impingement syndrome, and a potential torn rotator cuff.  - There is noticeable deformity and significant swelling in the left trapezius region. An x-ray was previously conducted at [place], [place], which did not reveal any dislocation or bone deformity.  - Given the persistent swelling and discomfort during lateral arm elevation, internal derangements are highly suspected. An MRI of the shoulder has been ordered.  - A referral to Dr. Rudy Bhakta at [facility] has been made. She is advised to schedule an appointment with Dr. Bhakta and to arrange for the MRI. In the interim, she can manage her symptoms with over-the-counter medications such as ibuprofen, Advil, Aleve, or Tylenol, and apply ice to the affected area. Rest is recommended, and strenuous activities should be avoided.    2. Red eyes.  Acute uncomplicated  - The patient's eyes appear normal at present, with no signs of infection.  - No physical exam findings of irritation or discharge.  - She is advised to use over-the-counter eye drops such as Zaditor or Visine for irritated allergy-related eye redness if the condition recurs.  - No further treatment is required at this time.    3. Health maintenance.  - She is due for a Pap smear.  - No previous Pap smear has been conducted.  - She is advised to return for a well-woman visit, including a Pap smear, when she is ready.  - Routine screening for HPV and cervical cancer is recommended for females over age 21.      Problem List Items Addressed This Visit    None  Visit Diagnoses         Injury of left shoulder, initial encounter    -  Primary    Relevant Orders    MR-SHOULDER-W/O LEFT    Referral to Orthopedics      Eye redness                Differential diagnosis, natural history, supportive care, and indications for immediate follow-up discussed.  Shared decision making approach was utilized, and patient is  amendable with plan of care.  Patient understands to return to clinic or go to the emergency department if symptoms worsen. All questions and concerns addressed to the best of my knowledge.      Return if symptoms worsen or fail to improve.    Please note that this dictation was created using voice recognition software. I have made every reasonable attempt to correct obvious errors, but I expect that there are errors of grammar and possibly content that I did not discover before finalizing the note.